# Patient Record
Sex: FEMALE | Race: WHITE | NOT HISPANIC OR LATINO | URBAN - METROPOLITAN AREA
[De-identification: names, ages, dates, MRNs, and addresses within clinical notes are randomized per-mention and may not be internally consistent; named-entity substitution may affect disease eponyms.]

---

## 2022-07-28 PROBLEM — M54.81 OCCIPITAL NEURALGIA OF LEFT SIDE: Status: ACTIVE | Noted: 2022-07-28

## 2022-07-28 PROBLEM — Z87.898 HISTORY OF DIZZINESS: Status: ACTIVE | Noted: 2022-07-28

## 2022-07-28 PROBLEM — M35.9 UNDIFFERENTIATED CONNECTIVE TISSUE DISEASE (HCC): Status: ACTIVE | Noted: 2022-07-28

## 2022-07-28 PROBLEM — I10 PRIMARY HYPERTENSION: Status: ACTIVE | Noted: 2022-07-28

## 2022-07-28 PROBLEM — R90.82 WHITE MATTER ABNORMALITY ON MRI OF BRAIN: Status: ACTIVE | Noted: 2022-07-28

## 2022-07-28 PROBLEM — I73.00 RAYNAUD'S PHENOMENON WITHOUT GANGRENE: Status: ACTIVE | Noted: 2022-07-28

## 2022-07-28 PROBLEM — G43.109 MIGRAINE WITH AURA AND WITHOUT STATUS MIGRAINOSUS, NOT INTRACTABLE: Status: ACTIVE | Noted: 2022-07-28

## 2022-07-28 PROBLEM — H35.033 HYPERTENSIVE RETINOPATHY OF BOTH EYES: Status: ACTIVE | Noted: 2022-07-28

## 2022-07-28 PROBLEM — I42.9 CARDIOMYOPATHY (HCC): Status: ACTIVE | Noted: 2022-07-28

## 2022-07-28 PROBLEM — M79.7 FIBROMYALGIA: Status: ACTIVE | Noted: 2022-07-28

## 2024-07-01 ENCOUNTER — DOCUMENTATION (OUTPATIENT)
Dept: CARDIOLOGY CLINIC | Facility: CLINIC | Age: 44
End: 2024-07-01

## 2024-07-02 ENCOUNTER — APPOINTMENT (OUTPATIENT)
Dept: LAB | Facility: CLINIC | Age: 44
End: 2024-07-02

## 2024-07-02 ENCOUNTER — OCCMED (OUTPATIENT)
Dept: URGENT CARE | Facility: CLINIC | Age: 44
End: 2024-07-02

## 2024-07-02 DIAGNOSIS — Z02.1 ENCOUNTER FOR PRE-EMPLOYMENT HEALTH SCREENING EXAMINATION: ICD-10-CM

## 2024-07-02 DIAGNOSIS — Z02.1 PRE-EMPLOYMENT EXAMINATION: ICD-10-CM

## 2024-07-02 DIAGNOSIS — Z02.1 ENCOUNTER FOR PRE-EMPLOYMENT HEALTH SCREENING EXAMINATION: Primary | ICD-10-CM

## 2024-07-02 LAB — RUBV IGG SERPL IA-ACNC: 66 IU/ML

## 2024-07-02 PROCEDURE — 86735 MUMPS ANTIBODY: CPT

## 2024-07-02 PROCEDURE — 86762 RUBELLA ANTIBODY: CPT

## 2024-07-02 PROCEDURE — 86480 TB TEST CELL IMMUN MEASURE: CPT

## 2024-07-02 PROCEDURE — 36415 COLL VENOUS BLD VENIPUNCTURE: CPT

## 2024-07-02 PROCEDURE — 86765 RUBEOLA ANTIBODY: CPT

## 2024-07-02 PROCEDURE — 86787 VARICELLA-ZOSTER ANTIBODY: CPT

## 2024-07-03 LAB
GAMMA INTERFERON BACKGROUND BLD IA-ACNC: 0.05 IU/ML
M TB IFN-G BLD-IMP: NEGATIVE
M TB IFN-G CD4+ BCKGRND COR BLD-ACNC: 0.16 IU/ML
M TB IFN-G CD4+ BCKGRND COR BLD-ACNC: 0.17 IU/ML
MEV IGG SER QL IA: NORMAL
MITOGEN IGNF BCKGRD COR BLD-ACNC: 9.95 IU/ML
MUV IGG SER QL IA: NORMAL
VZV IGG SER QL IA: NORMAL

## 2024-07-08 NOTE — PROGRESS NOTES
HCM Consultation Visit - Cardiology   Zohreh Woodard 44 y.o. female MRN: [id number]  Unit/Bed#:  Encounter: [id number]    Patient Active Problem List    Diagnosis Date Noted    Primary hypertension 07/28/2022    Cardiomyopathy (HCC) 07/28/2022    Hypertensive retinopathy of both eyes 07/28/2022    Migraine with aura and without status migrainosus, not intractable 07/28/2022    Occipital neuralgia of left side 07/28/2022    Raynaud's phenomenon without gangrene 07/28/2022    History of dizziness 07/28/2022    White matter abnormality on MRI of brain 07/28/2022    Undifferentiated connective tissue disease (HCC) 07/28/2022    Fibromyalgia 07/28/2022     Plan: Ms Abarca hypertension was first clinically discovered at age 25 during her second pregnancy that was also associated with preeclampsia. In November 2021, she was seen by an ophthalmologist and found to have hypertensive retinopathy and a blood pressure of 250 systolic. A year later, her ECG found to be abnormal and that lead to an echocardiogram that disclosed phenotypic hypertrophic obstructive cardiomyopathy. Her hypertension has remained inadequately controlled over the years with a vasodilator (losartan) and a diuretic (HCTZ). On 6- she was admitted with severe chest pain and shortness of breath and found to have hypertension, troponin release and LVOT obstruction. Her antihypertensive medications were switched to verapamil (120 mg daily) and metoprolol succinate (25 mg twice daily) upon discharge. She has noted that her symptoms improved on metoprolol and that she would become more symptomatic towards the end of the 12 hours of metoprolol dose. On cardiology outpatient visit losartan and HCTZ were reinstated due to inadequately controlled blood pressure. Today her blood pressure is 164/90 mmHg in the office. She does not check her blood pressure at home frequently. Today, we spoke about hypertension being the most urgent problem to attend to. She  already has evidence of end-organ damage with hypertensive retinopathy and cerebral microangiopathy. Increased cardiovascular risk is also imposed by the history of preeclampsia. She uses salt liberally as part of her everyday diet. I have asked her to:  Stop losartan/HCTZ  Increase verapamil to 240 mg at night (from the current 120 mg)  Increase metoprolol to 25 mg 3 times daily (at 7 am, 3 pm and 10 pm) from the current 2 times a day.   Restrict salt intake (carefully review labels and do not add salt to food at the table to achieve a 2 gm sodium per day limit, may use salt substitute for flavoring)  Take blood pressure and heart rate readings twice a day in the morning and evening after resting for 5 minutes quietly and record to share.    She is symptomatic with chest discomfort (now mostly before the next dose of metoprolol), dyspnea on exertion (with mild to moderate activity), frequent episodes of orthostatic dizziness and palpitation (improved on metoprolol). He has had one episode of syncope 2 years ago that appears to have been related at least partially to dehydration. She has not noted any lower extremity edema.    I have scheduled her for a bicycle exercise stress echocardiogram in follow up of her episode with troponin leak. A 2-week extended ambulatory heart monitor has also been ordered. She is hesitant to have a cardiac MRI done due to claustrophobia despite premedication. At completion of blood pressure control and workup, we will evaluate her for treatment of her obstructive cardiomyopathy (mavacamten).    Physician Requesting Consult: Douglas Schoenberger, MD  Reason for Consult / Principal Problem: Hypertrophic Cardiomyopathy    HPI: Ms Zohreh Woodard is a 44 year old woman who was once scheduled to be seen in [facility] for suspected HCM but missed the appointment and was subsequently admitted to [facility] with chest pain, troponin leak and evidence of LVOTO on 6-. She is a smoker and has had  chest pain, RASMUSSEN, palpitations, and presyncope for years with recent worsening. She has history of malignant hypertensive retinopathy and papilledema and recent blood pressure of > [id number] during hospital admission. MRI of brain in 2022 had shown multiple small chronic infarcts compatible with small vessel ischemia. An echo in 4/2022 revealed LVEF 70% with severe concentric left ventricular hypertrophy, LIV, mildly reduced RV function with TAPSE 1.5, elevated left atrial pressures and peak LVOT gradient of 100 mmHg with Valsalva. Nuclear myocardial perfusion study in 5/2022 showed no inducible ischemia. She saw Dr. Stanton in 8/2022 who had concerns about HOCM and ordered cMRI which has not been done (claustrophobic, did not tolerate cardiac MRI machine even with benzodiazepine). During recent admission, with LVOTO and type II myocardial injury, she received IV fluids and had negative D-dimer and lung perfusion scan. Her losartan and hydrochlorothiazide was stopped and she was started on losartan 50 mg/HCTZ 25, verapamil 120 mg daily and metoprolol succinate 25 mg twice a day. Echocardiogram later showed no LVOTO. She was asked to undergo stress echo with focus on LVOT gradients.          Risk stratification:    Nonsustained ventricular tachycardia - Ambulatory monitor ordered  Severe left ventricular hypertrophy - No  Family history of sudden death - No  Unexplained syncope - One episode of syncope 2 years ago  LVOT obstruction - Yes  Atrial fibrillation and left atrial dilation - No  Age - 44  NYHA - II-III  Myocardial fibrosis - NA  LV systolic dysfunction - No  Apical aneurysm - No    Review of systems: She is symptomatic with chest discomfort (now mostly before the next dose of metoprolol), dyspnea on exertion (with mild to moderate activity), frequent episodes of orthostatic dizziness and palpitation (improved on metoprolol). He has had one episode of syncope 2 years ago that appears to have been related at  "least partially to dehydration. She has not noted any lower extremity edema.    Family history: Mother is 74 and has hypertension. Father is 75 and has no heart disease. Paternal grandmother  in her sleep from \"heart attack\". Maternal grandfather had a pacemaker and had heart surgery. She has a 43 year old brother with a heart murmur. She has 3 daughters (17, 19 and 26) who are healthy. There is no history of sudden unexplained death in the extended family other than the paternal grandmother.    Genetic testing: Initiated    Devices: None    Historical Information   Past Medical History:   Diagnosis Date    Current moderate episode of major depressive disorder (HCC)     H/O: 1 miscarriage     1st trimester    Hepatitis C antibody positive in blood     Hypertension     Hypertensive retinopathy of both eyes     Migraine with aura and without status migrainosus, not intractable     Moderate episode of recurrent major depressive disorder (HCC)     Occipital neuralgia of left side     White matter abnormality on MRI of brain      Past Surgical History:   Procedure Laterality Date    DILATION AND EVACUATION N/A     1st trimester     Family History   Problem Relation Age of Onset    Hypertension Mother     Hypertension Father     Arthritis Father     Osteoporosis Father     Breast cancer Maternal Grandmother     Diabetes Maternal Grandmother     Heart disease Maternal Grandfather     Stroke Maternal Grandfather     Heart disease Paternal Grandmother     Lung cancer Paternal Aunt      Current Outpatient Medications on File Prior to Visit   Medication Sig Dispense Refill    ALPRAZolam (XANAX) 0.5 mg tablet Take 0.5 mg by mouth if needed      buPROPion (WELLBUTRIN SR) 150 mg 12 hr tablet Take 150 mg by mouth 2 (two) times a day      Cholecalciferol 125 MCG (5000 UT) capsule Take 5,000 Units by mouth daily      Hyzaar 100-25 MG per tablet Take 1 tablet by mouth daily      liraglutide (SAXENDA) injection Inject 3 mg under " "the skin daily       No current facility-administered medications on file prior to visit.     Allergies   Allergen Reactions    Penicillins Other (See Comments)     Not reported by patient     Social History     Substance and Sexual Activity   Alcohol Use Yes    Comment: social     Social History     Substance and Sexual Activity   Drug Use Not on file     Social History     Tobacco Use   Smoking Status Every Day    Current packs/day: 0.50    Average packs/day: 0.5 packs/day for 25.0 years (12.5 ttl pk-yrs)    Types: Cigarettes   Smokeless Tobacco Never     Objective   Vitals:   Vitals:    07/09/24 1117   BP: 164/90   BP Location: Left arm   Patient Position: Sitting   Cuff Size: Standard   Pulse: 64   SpO2: 99%   Weight: 58.5 kg (129 lb)   Body surface area is 1.63 meters squared.  Body mass index is 21.8 kg/m².    Invasive Devices       None                 Physical Exam:  GEN: Zohreh Woodard appears well, alert and oriented x 3, pleasant and cooperative   HEENT: pupils equal, round, and reactive to light; extraocular muscles intact  NECK: supple, no carotid bruits   HEART: regular rhythm, normal S1 and S2, harsh 3/6 murmur heard throughout the precordium with accentuation during standing and obliteration with squatting, no clicks, gallops or rubs   LUNGS: clear to auscultation bilaterally; no wheezes, rales, or rhonchi   ABDOMEN: normal bowel sounds, soft, no tenderness, no distention  EXTREMITIES: peripheral pulses normal; no clubbing, cyanosis, or edema  NEURO: no focal findings   SKIN: normal without suspicious lesions on exposed skin    Lab Results:   No results found for: \"WBC\", \"RBC\", \"HGB\", \"HCT\", \"MCV\", \"PLT\", \"RDW\"  Lab Results   Component Value Date    K 4.4 06/29/2024     06/29/2024    CO2 24 06/29/2024    BUN 15 06/29/2024    CREATININE 1.26 (H) 06/29/2024    EGFR 54 (L) 06/29/2024    CALCIUM 8.6 06/29/2024    AST 22 06/28/2024    ALT 10 06/28/2024    ALKPHOS 43 06/28/2024     Lab Results " "  Component Value Date    MG 1.8 06/29/2024     No results found for: \"CHOL\", \"HDL\", \"TRIG\", \"LDLCALC\"  No results found for: \"CEG1JZBYQLBA\", \"T3FREE\", \"FREET4\", \"L8ZFDBW\", \"N4RFSLL\"    Imaging:   I have personally reviewed pertinent films in PACS  NM lung perfusion imaging    Result Date: 6/28/2024  Narrative: This result has an attachment that is not available. Lung perfusion scan History: Elevated d-dimer. Chest pain. Radiopharmaceutical and technique: 2.2 mCi of Tc-99m MAA was administered intravenously for lung perfusion imaging. Anterior, posterior, right and left anterior and posterior oblique as well as right and left lateral projection of both lungs were obtained for perfusion imaging. Comparison: No prior VQ scan. Recent frontal chest radiograph from 6/27/2024 was reviewed Findings: The perfusion images demonstrate slightly heterogeneous distribution of radiotracer without any segmental or subsegmental perfusion defect.    Impression: Impression:  Low probability for pulmonary embolism. Workstation:[id number]    ECHO 2D COMPLETE DOPPLER AND COLOR FLOW WITH CONTRAST    Result Date: 6/28/2024  Narrative: This result has an attachment that is not available. Sinus rhythm.  Heart rate 70 bpm. Hypertrophic cardiomyopathy with disproportionate hypertrophy of the interventricular septum and increased LV mass index (139 g/m²). No intraventricular or LVOT obstruction at rest or during Valsalva's maneuver. No LV apical aneurysm. No anomalous papillary muscle structure. Normal LV systolic function.  LVEF 73%. Normal LV diastolic and LA pressure (IVRT 187 ms). No LA enlargement. No pulmonary hypertension. Normal RV size and systolic function with no RVOT obstruction. No pericardial disease. No valve disease. No intracardiac tumor, thrombus or valve vegetation. Comments: There was no significant increase in heart rate during the Valsalva maneuver suggesting the maneuver may not have provoked a reduction in left ventricular " filling volume. Left Ventricle Left ventricle is normal in size. There is severe hypertrophy with disproportionate hypertrophy of the IVS (1.7 cm), relative to the inferior wall (1.4 cm). No anomalous papillary muscles. No apical aneurysm. No intraventricular thrombus. Systolic function is normal with an ejection fraction of 61-65%. No intraventricular cavity or LVOT obstruction at rest. No provokable LVOT obstruction during a Valsalva's maneuver. Wall motion is within normal limits. Left atrial pressure is normal, as assessed by prolonged YEED163 ms. Right Ventricle Right ventricle cavity is normal. Systolic function is normal. Left Atrium Left atrium cavity size is normal. Right Atrium Right atrium cavity is normal. IVC/SVC The inferior vena cava demonstrates a diameter of <=21 mm and collapses >50%; therefore, the right atrial pressure is estimated at 0-5 mmHg. Mitral Valve Mitral valve structure is normal. There is trace regurgitation. There is no evidence of mitral valve stenosis. There is no systolic anterior motion and no septal contact between the anterior mitral valve leaflet and the IVS. Tricuspid Valve Tricuspid valve structure is normal. There is trace regurgitation. There is no evidence of tricuspid valve stenosis. Aortic Valve The aortic valve is trileaflet. There is no regurgitation or stenosis. Pulmonic Valve Pulmonic valve structure is normal. There is no regurgitation or stenosis. Ascending Aorta The aorta appears normal in size. Pericardium There is no pericardial effusion. Noncardiac Significant Findings: [place] Act 112. Study Details A complete 2D echocardiogram was performed. Color flow, Pulse Wave and Continuous Wave Doppler was performed and analyzed.Definity contrast was used during the study. The study was difficult due to patient's clinical status and heart rhythm.    US VENOUS DUPLEX LOWER EXTREMITY BILATERAL    Result Date: 6/28/2024  Narrative: Indication: Elevated d-dimer, assess for DVT.  Comparison: None. Duplex color and compression ultrasound of the deep veins of both legs was performed. B-mode two-dimensional vascular structure, Doppler spectral analysis, and color flow Doppler imaging was performed. Findings: The common femoral, femoral, popliteal, greater saphenous, and calf veins are patent and compressible within both legs, without evidence of deep venous thrombosis or venous obstruction.    Impression: Impression: No evidence of DVT or venous abnormality within either leg. Workstation:[id number]    XR chest pa & lateral    Result Date: 6/27/2024  Narrative: PROCEDURE INFORMATION: Exam: XR Chest Exam date and time: 6/27/2024 10:24 PM Age: 44 years old Clinical indication: Pain; Angina pectoris; Additional info: Chest pain for one month TECHNIQUE: Imaging protocol: Radiologic exam of the chest. Views: 2 views. COMPARISON: No relevant prior studies available. FINDINGS: Lungs: No consolidation or pulmonary edema. Pleural spaces: No pleural effusion. No pneumothorax. Heart/Mediastinum: Cardiomediastinal silhouette is normal in size. Bones/joints: No acute fractures.     Impression: IMPRESSION: No acute findings.     EKG: Outside ECG dated 7-1-2024. Sinus rhythm at 93 bpm, LVH with repolarization abnormalities, LAE    Counseling / Coordination of Care  Total time spent today 65 minutes.  Greater than 50% of total time was spent with the patient and / or family counseling and / or coordination of care.

## 2024-07-09 ENCOUNTER — OFFICE VISIT (OUTPATIENT)
Dept: CARDIOLOGY CLINIC | Facility: CLINIC | Age: 44
End: 2024-07-09
Payer: COMMERCIAL

## 2024-07-09 VITALS
SYSTOLIC BLOOD PRESSURE: 164 MMHG | OXYGEN SATURATION: 99 % | WEIGHT: 129 LBS | DIASTOLIC BLOOD PRESSURE: 90 MMHG | HEART RATE: 64 BPM | BODY MASS INDEX: 21.8 KG/M2

## 2024-07-09 DIAGNOSIS — I42.9 CARDIOMYOPATHY, UNSPECIFIED TYPE (HCC): ICD-10-CM

## 2024-07-09 DIAGNOSIS — I10 PRIMARY HYPERTENSION: Primary | ICD-10-CM

## 2024-07-09 PROCEDURE — 99205 OFFICE O/P NEW HI 60 MIN: CPT | Performed by: INTERNAL MEDICINE

## 2024-07-09 RX ORDER — METOPROLOL SUCCINATE 25 MG/1
25 TABLET, EXTENDED RELEASE ORAL 2 TIMES DAILY
COMMUNITY

## 2024-07-10 ENCOUNTER — DOCUMENTATION (OUTPATIENT)
Dept: VASCULAR SURGERY | Facility: CLINIC | Age: 44
End: 2024-07-10

## 2024-07-15 ENCOUNTER — TELEPHONE (OUTPATIENT)
Dept: CARDIOLOGY CLINIC | Facility: CLINIC | Age: 44
End: 2024-07-15

## 2024-07-17 ENCOUNTER — TELEPHONE (OUTPATIENT)
Dept: CARDIOLOGY CLINIC | Facility: CLINIC | Age: 44
End: 2024-07-17

## 2024-07-17 NOTE — TELEPHONE ENCOUNTER
L/M/M for patient- as per Dr Jones's request, he would like patient to send 1 week of blood pressure/heart rate readings

## 2024-08-12 ENCOUNTER — HOSPITAL ENCOUNTER (OUTPATIENT)
Dept: NON INVASIVE DIAGNOSTICS | Facility: HOSPITAL | Age: 44
Discharge: HOME | End: 2024-08-12
Payer: COMMERCIAL

## 2024-08-12 VITALS
HEART RATE: 75 BPM | HEIGHT: 65 IN | SYSTOLIC BLOOD PRESSURE: 140 MMHG | BODY MASS INDEX: 21.49 KG/M2 | DIASTOLIC BLOOD PRESSURE: 100 MMHG | WEIGHT: 129 LBS

## 2024-08-12 DIAGNOSIS — I42.9 CARDIOMYOPATHY, UNSPECIFIED TYPE (HCC): ICD-10-CM

## 2024-08-12 LAB
AORTIC ROOT: 3.2 CM
FRACTIONAL SHORTENING: 30 (ref 28–44)
GLOBAL LONGITUIDAL STRAIN: -10 %
INTERVENTRICULAR SEPTUM IN DIASTOLE (PARASTERNAL SHORT AXIS VIEW): 1.9 CM
INTERVENTRICULAR SEPTUM: 1.9 CM (ref 0.6–1.1)
LEFT INTERNAL DIMENSION IN SYSTOLE: 2.3 CM (ref 2.1–4)
LEFT VENTRICULAR INTERNAL DIMENSION IN DIASTOLE: 3.3 CM (ref 3.5–6)
LEFT VENTRICULAR POSTERIOR WALL IN END DIASTOLE: 1.3 CM
LEFT VENTRICULAR STROKE VOLUME: 25 ML
LVSV (TEICH): 25 ML
MAX HR PERCENT: 65 %
MAX HR: 114 BPM
RATE PRESSURE PRODUCT: NORMAL
SL CV LV EF: 74
SL CV PED ECHO LEFT VENTRICLE DIASTOLIC VOLUME (MOD BIPLANE) 2D: 43 ML
SL CV PED ECHO LEFT VENTRICLE SYSTOLIC VOLUME (MOD BIPLANE) 2D: 18 ML
SL CV STRESS RECOVERY BP: NORMAL MMHG
SL CV STRESS RECOVERY HR: 82 BPM
SL CV STRESS RECOVERY O2 SAT: 98 %
STRESS ANGINA INDEX: 0
STRESS BASELINE BP: NORMAL MMHG
STRESS BASELINE HR: 75 BPM
STRESS O2 SAT REST: 97 %
STRESS PEAK HR: 114 BPM
STRESS POST ESTIMATED WORKLOAD: 6.9 METS
STRESS POST EXERCISE DUR MIN: 6 MIN
STRESS POST EXERCISE DUR SEC: 56 SEC
STRESS POST O2 SAT PEAK: 99 %
STRESS POST PEAK BP: 212 MMHG

## 2024-08-12 PROCEDURE — 93356 MYOCRD STRAIN IMG SPCKL TRCK: CPT

## 2024-08-12 PROCEDURE — 93356 MYOCRD STRAIN IMG SPCKL TRCK: CPT | Performed by: INTERNAL MEDICINE

## 2024-08-12 PROCEDURE — 93350 STRESS TTE ONLY: CPT

## 2024-08-12 PROCEDURE — 93350 STRESS TTE ONLY: CPT | Performed by: INTERNAL MEDICINE

## 2024-08-13 ENCOUNTER — TELEPHONE (OUTPATIENT)
Dept: CARDIOLOGY CLINIC | Facility: CLINIC | Age: 44
End: 2024-08-13

## 2024-08-13 DIAGNOSIS — I42.2 HYPERTROPHIC CARDIOMYOPATHY (HCC): Primary | ICD-10-CM

## 2024-08-13 RX ORDER — BISOPROLOL FUMARATE 10 MG/1
10 TABLET, FILM COATED ORAL DAILY
Qty: 30 TABLET | Refills: 1 | Status: SHIPPED | OUTPATIENT
Start: 2024-08-13 | End: 2024-10-12

## 2024-08-13 NOTE — PROGRESS NOTES
Patient underwent exercise (bicycle) stress echocardiography yesterday with the following results:    A bicycle protocol stress test was performed. Overall, the patient's exercise capacity was moderately impaired for their age. The patient after exercising for 6 min and 56 sec and had a maximal HR of 114 bpm (65% of MPHR) and 6.9 METS. The patient experienced no angina during the test. The test was stopped because the patient experienced fatigue and dyspnea. The test was stopped because of hypertension. The patient reported dyspnea and fatigue during the stress test. Symptoms began during stress and ended during recovery. Blood pressure demonstrated a hypertensive response and heart rate demonstrated a blunted response to stress.       Left Ventricle: Wall thickness is severely increased. Maximal thickness 26 mm. There is severe asymmetric hypertrophy of the basal and mid septal walls. The left ventricular ejection fraction is 74%. Systolic function is hyperdynamic. Global longitudinal strain is reduced at -10%. Wall motion is normal. Diastolic function is mildly abnormal, consistent with grade I (abnormal) relaxation. There are sequential mid-cavitary and outflow tract dynamic obstruction at rest with a peak gradient of 50 mmHg. There are sequential mid-cavitary and outflow tract dynamic obstruction with Valsalva with a peak gradient of 115 mmHg.    Aortic Valve: There is mild regurgitation.    Mitral Valve: There is systolic anterior motion with late peaking gradient.    Stress ECG: Down sloping ST depression in the inferolateral leads (II, III, aVF, V5 and V6) is noted. There were no arrhythmias during recovery. . The stress ECG is equivocal for ischemia after submaximal exercise, without reproduction of symptoms.    Post Stress Echo: Left ventricle cavity is small post-stress. The left ventricle systolic function is hyperdynamic post-stress.    Echo Post Impression: The study is negative for inducible myocardial  ischemia at submaximal exercise. Findings are consistent with obstructive hypertrophic cardiomyopathy.    The test revealed poor exercise tolerance, uncontrolled hypertension, obstructive hypertrophic cardiomyopathy with a maximum LV wall thickness of 26 mmHg.    She was advised to:  Decrease salt intake to <1500 mg perday.  Measure blood pressure and heart rate twice daily and chart  Continue verapamil 240 mg per day.at night  Discontinue metoprolol  Start bisoprolol 10 mg per day (morning)    Temo Jones MD

## 2024-08-13 NOTE — TELEPHONE ENCOUNTER
"I tried to reach pt via telephone but needed to leave a message and also left a message for pt in [employer]. Medication directions Per Dr Jones:  She was advised to:  Decrease salt intake to <1500 mg perday.  Measure blood pressure and heart rate twice daily and chart   Continue verapamil 240 mg per day.at night  Discontinue metoprolol  Start bisoprolol 10 mg per day (morning)- medication called into pharmacy this morning  Finish today w/ original medication; start new regimen tomorrow 8/14/24   Asked to Please call our office to acknowledge medication changes [phone]   I will call in a week for blood pressure and heart rate readings, or asked to please send a message in \"[employer]\" to Dr Jones with them   "

## 2024-08-15 LAB
CHEST PAIN STATEMENT: NORMAL
MAX DIASTOLIC BP: 104 MMHG
MAX PREDICTED HEART RATE: 176 BPM
PROTOCOL NAME: NORMAL
STRESS POST EXERCISE DUR MIN: 6 MIN
STRESS POST EXERCISE DUR SEC: 56 SEC
STRESS POST PEAK HR: 115 BPM
STRESS POST PEAK SYSTOLIC BP: 212 MMHG
TARGET HR FORMULA: NORMAL
TEST INDICATION: NORMAL

## 2024-09-12 DIAGNOSIS — I42.2 HYPERTROPHIC CARDIOMYOPATHY (HCC): ICD-10-CM

## 2024-09-12 RX ORDER — BISOPROLOL FUMARATE 10 MG/1
10 TABLET, FILM COATED ORAL DAILY
Qty: 90 TABLET | Refills: 1 | Status: SHIPPED | OUTPATIENT
Start: 2024-09-12

## 2024-09-18 ENCOUNTER — TELEPHONE (OUTPATIENT)
Dept: CARDIOLOGY CLINIC | Facility: CLINIC | Age: 44
End: 2024-09-18

## 2024-09-18 DIAGNOSIS — I10 PRIMARY HYPERTENSION: ICD-10-CM

## 2024-09-18 DIAGNOSIS — I42.2 HYPERTROPHIC CARDIOMYOPATHY (HCC): Primary | ICD-10-CM

## 2024-09-18 RX ORDER — VERAPAMIL HYDROCHLORIDE 240 MG/1
240 TABLET, FILM COATED, EXTENDED RELEASE ORAL
Qty: 90 TABLET | Refills: 2 | Status: SHIPPED | OUTPATIENT
Start: 2024-09-18 | End: 2025-06-15

## 2024-09-18 NOTE — TELEPHONE ENCOUNTER
L/M/M for patient -verapamil was refilled. However, it was changed to 240 mg, so she is to take only one at bedtime . Call us w/ any issues

## 2024-11-29 ENCOUNTER — OFFICE VISIT (OUTPATIENT)
Dept: URGENT CARE | Facility: CLINIC | Age: 44
End: 2024-11-29
Payer: COMMERCIAL

## 2024-11-29 DIAGNOSIS — Z23 ENCOUNTER FOR IMMUNIZATION: Primary | ICD-10-CM

## 2024-11-29 PROCEDURE — G0381 LEV 2 HOSP TYPE B ED VISIT: HCPCS

## 2024-11-29 PROCEDURE — S9083 URGENT CARE CENTER GLOBAL: HCPCS

## 2024-11-29 PROCEDURE — 90656 IIV3 VACC NO PRSV 0.5 ML IM: CPT

## 2024-11-29 PROCEDURE — 90471 IMMUNIZATION ADMIN: CPT

## 2024-12-26 ENCOUNTER — TELEPHONE (OUTPATIENT)
Age: 44
End: 2024-12-26

## 2025-01-13 NOTE — PROGRESS NOTES
[facility] Follow-up Visit - Cardiology   Zohreh Woodard 44 y.o. female MRN: [id number]  Unit/Bed#:  Encounter: [id number]    Patient Active Problem List    Diagnosis Date Noted    Primary hypertension 07/28/2022    Cardiomyopathy (HCC) 07/28/2022    Hypertensive retinopathy of both eyes 07/28/2022    Migraine with aura and without status migrainosus, not intractable 07/28/2022    Occipital neuralgia of left side 07/28/2022    Raynaud's phenomenon without gangrene 07/28/2022    History of dizziness 07/28/2022    White matter abnormality on MRI of brain 07/28/2022    Undifferentiated connective tissue disease (HCC) 07/28/2022    Fibromyalgia 07/28/2022     Plan: Ms Zohreh Woodard was seen on consultation in the [facility] on 7-9-2024. He has severe, difficult to control hypertension and obstructive hypertrophic cardiomyopathy. At that time she was taken off a vasodilator and a diuretic and was started on verapamil and metoprolol. Metoprolol was later switched to bisoprolol for better blood pressure control. On 8-, she underwent exercise (bicycle) stress echocardiography:    A bicycle protocol stress test was performed. Overall, the patient's exercise capacity was moderately impaired for their age. The patient after exercising for 6 min and 56 sec and had a maximal HR of 114 bpm (65% of MPHR) and 6.9 METS. The patient experienced no angina during the test. The test was stopped because the patient experienced fatigue and dyspnea. The test was stopped because of hypertension. The patient reported dyspnea and fatigue during the stress test. Symptoms began during stress and ended during recovery. Blood pressure demonstrated a hypertensive response and heart rate demonstrated a blunted response to stress.     Left Ventricle: Wall thickness is severely increased. Maximal thickness 26 mm. There is severe asymmetric hypertrophy of the basal and mid septal walls. The left ventricular ejection fraction is 74%. Systolic  function is hyperdynamic. Global longitudinal strain is reduced at -10%. Wall motion is normal. Diastolic function is mildly abnormal, consistent with grade I (abnormal) relaxation. There are sequential mid-cavitary and outflow tract dynamic obstruction at rest with a peak gradient of 50 mmHg. There are sequential mid-cavitary and outflow tract dynamic obstruction with Valsalva with a peak gradient of 115 mmHg.    Aortic Valve: There is mild regurgitation.    Mitral Valve: There is systolic anterior motion with late peaking gradient.    Stress ECG: Down sloping ST depression in the inferolateral leads (II, III, aVF, V5 and V6) is noted. There were no arrhythmias during recovery. . The stress ECG is equivocal for ischemia after submaximal exercise, without reproduction of symptoms.    Post Stress Echo: Left ventricle cavity is small post-stress. The left ventricle systolic function is hyperdynamic post-stress.    Echo Post Impression: The study is negative for inducible myocardial ischemia at submaximal exercise. Findings are consistent with obstructive hypertrophic cardiomyopathy.    Ms Woodard had made significant progress while on semaglutide and had lost nearly 30 lbs with improved overall health. Unfortunately due to insurance issues she has had to stop the medication and has regained all the weight she has lost and is currently in poor general health and her blood pressure is 154/80 and she is experiencing worsening exertional dyspnea, bendopnea and recurrent palpitations. She has also noted lower extremity edema. She denies chest pain, dizziness, syncope or headaches. She does not drink much water on the daily basis, does not restrict sodium in her diet and is mostly sedentary.  On physical examination she does have the typical murmur of dynamic LVOT obstruction. She has not done her cardiac MRI due to claustrophobia and there has been no recent blood work.     The following recommendations were made:  Adequate  hydration  Start restricting the amount of sodium (<2 gm) in diet  Start measuring BP and HR at home twice a day and record for review  Will adjust BP medications as needed to control blood pressure with a goal of consistent control <130/85 mmHg  Obtain blood work to assess kidney function  If feasible obtain cardiac MRI with IV sedation  Restart semaglutide and resume dieting to lose the excess weight  Will reassess with echocardiography to consider mavacamten  Perform genetic testing to assist with family screening (one brother and 3 young daughters)    Physician Requesting Consult: Douglas Schoenberger, MD  Reason for Consult / Principal Problem: Hypertrophic Cardiomyopathy     HPI: Ms Zohreh Woodard is a 44 year old woman who was once scheduled to be seen in [facility] for suspected HCM but missed the appointment and was subsequently admitted to [facility] with chest pain, troponin leak and evidence of LVOTO on 6-. She is a smoker and has had chest pain, RASMUSSEN, palpitations, and presyncope for years with recent worsening. She has history of malignant hypertensive retinopathy and papilledema and recent blood pressure of > 470121 during hospital admission. MRI of brain in 2022 had shown multiple small chronic infarcts compatible with small vessel ischemia. An echo in 4/2022 revealed LVEF 70% with severe concentric left ventricular hypertrophy, LIV, mildly reduced RV function with TAPSE 1.5, elevated left atrial pressures and peak LVOT gradient of 100 mmHg with Valsalva. Nuclear myocardial perfusion study in 5/2022 showed no inducible ischemia. She saw Dr. Stanton in 8/2022 who had concerns about HOCM and ordered cMRI which has not been done (claustrophobic, did not tolerate cardiac MRI machine even with benzodiazepine). During recent admission, with LVOTO and type II myocardial injury, she received IV fluids and had negative D-dimer and lung perfusion scan. Her losartan and hydrochlorothiazide was stopped and she  was started on verapamil 120 mg daily and metoprolol succinate 25 mg twice a day. Echocardiogram later showed no LVOTO. She was asked to undergo stress echo with focus on LVOT gradients.            7-9-2024: Ms Abarca hypertension was first clinically discovered at age 25 during her second pregnancy that was also associated with preeclampsia. In November 2021, she was seen by an ophthalmologist and found to have hypertensive retinopathy and a blood pressure of 250 systolic. A year later, her ECG found to be abnormal and that lead to an echocardiogram that disclosed phenotypic hypertrophic obstructive cardiomyopathy. Her hypertension has remained inadequately controlled over the years with a vasodilator (losartan) and a diuretic (HCTZ). On 6- she was admitted with severe chest pain and shortness of breath and found to have hypertension, troponin release and LVOT obstruction. Her antihypertensive medications were switched to verapamil (120 mg daily) and metoprolol succinate (25 mg twice daily) upon discharge. She has noted that her symptoms improved on metoprolol and that she would become more symptomatic towards the end of the 12 hours of metoprolol dose. On cardiology outpatient visit losartan and HCTZ were reinstated due to inadequately controlled blood pressure. Today her blood pressure is 164/90 mmHg in the office. She does not check her blood pressure at home frequently. Today, we spoke about hypertension being the most urgent problem to attend to. She already has evidence of end-organ damage with hypertensive retinopathy and cerebral microangiopathy. Increased cardiovascular risk is also imposed by the history of preeclampsia. She uses salt liberally as part of her everyday diet. I have asked her to:  Stop losartan/HCTZ  Increase verapamil to 240 mg at night (from the current 120 mg)  Increase metoprolol to 25 mg 3 times daily (at 7 am, 3 pm and 10 pm) from the current 2 times a day.   Restrict salt  "intake (carefully review labels and do not add salt to food at the table to achieve a 2 gm sodium per day limit, may use salt substitute for flavoring)  Take blood pressure and heart rate readings twice a day in the morning and evening after resting for 5 minutes quietly and record to share.     She is symptomatic with chest discomfort (now mostly before the next dose of metoprolol), dyspnea on exertion (with mild to moderate activity), frequent episodes of orthostatic dizziness and palpitation (improved on metoprolol). He has had one episode of syncope 2 years ago that appears to have been related at least partially to dehydration. She has not noted any lower extremity edema.     I have scheduled her for a bicycle exercise stress echocardiogram in follow up of her episode with troponin leak. A 2-week extended ambulatory heart monitor has also been ordered. She is hesitant to have a cardiac MRI done due to claustrophobia despite premedication. At completion of blood pressure control and workup, we will evaluate her for treatment of her obstructive cardiomyopathy (mavacamten).    Risk stratification:     Nonsustained ventricular tachycardia - Ambulatory monitor ordered  Severe left ventricular hypertrophy - No  Family history of sudden death - No  Unexplained syncope - One episode of syncope 2 years ago  LVOT obstruction - Yes  Atrial fibrillation and left atrial dilation - No  Age - 44  NYHA - II-III  Myocardial fibrosis - NA  LV systolic dysfunction - No  Apical aneurysm - No     Review of systems: She is experiencing worsening exertional dyspnea, bendopnea and recurrent palpitations. She has also noted lower extremity edema. She denies chest pain, dizziness, syncope or headaches.     Family history: Mother is 74 and has hypertension and dementia. Father is 76 and has no heart disease. Paternal grandmother  in her sleep from \"heart attack\". Maternal grandfather had a pacemaker and had heart surgery. She has a " 43 year old brother with a heart murmur. She has 3 daughters (17, 19 and 27) who are healthy. There is no history of sudden unexplained death in the extended family other than the paternal grandmother.     Genetic testing: Initiated     Devices: None    Historical Information   Past Medical History:   Diagnosis Date    Current moderate episode of major depressive disorder (HCC)     H/O: 1 miscarriage     1st trimester    Hepatitis C antibody positive in blood     Hypertension     Hypertensive retinopathy of both eyes     Migraine with aura and without status migrainosus, not intractable     Moderate episode of recurrent major depressive disorder (HCC)     Occipital neuralgia of left side     White matter abnormality on MRI of brain      Past Surgical History:   Procedure Laterality Date    DILATION AND EVACUATION N/A     1st trimester     Family History   Problem Relation Age of Onset    Hypertension Mother     Hypertension Father     Arthritis Father     Osteoporosis Father     Breast cancer Maternal Grandmother     Diabetes Maternal Grandmother     Heart disease Maternal Grandfather     Stroke Maternal Grandfather     Heart disease Paternal Grandmother     Lung cancer Paternal Aunt      Current Outpatient Medications on File Prior to Visit   Medication Sig Dispense Refill    ALPRAZolam (XANAX) 0.5 mg tablet Take 0.5 mg by mouth if needed      bisoprolol (ZEBETA) 10 MG tablet TAKE 1 TABLET BY MOUTH EVERY DAY 90 tablet 1    buPROPion (WELLBUTRIN SR) 150 mg 12 hr tablet Take 300 mg by mouth 2 (two) times a day      Cholecalciferol 125 MCG (5000 UT) capsule Take 5,000 Units by mouth daily      Hyzaar 100-25 MG per tablet Take 1 tablet by mouth daily (Patient not taking: Reported on 8/12/2024)      liraglutide (SAXENDA) injection Inject 3 mg under the skin daily (Patient not taking: Reported on 7/9/2024)      verapamil (CALAN-SR) 240 mg CR tablet Take 1 tablet (240 mg total) by mouth daily at bedtime 90 tablet 2     No  "current facility-administered medications on file prior to visit.     Allergies   Allergen Reactions    Penicillins Other (See Comments)     Not reported by patient     Social History     Substance and Sexual Activity   Alcohol Use Yes    Comment: social     Social History     Substance and Sexual Activity   Drug Use Not on file     Social History     Tobacco Use   Smoking Status Every Day    Current packs/day: 0.50    Average packs/day: 0.5 packs/day for 25.0 years (12.5 ttl pk-yrs)    Types: Cigarettes   Smokeless Tobacco Never     Objective   Vitals:   Vitals:    01/14/25 1444   BP: 154/80   BP Location: Right arm   Patient Position: Sitting   Cuff Size: Standard   Pulse: 58   SpO2: 93%   Weight: 77.3 kg (170 lb 6.4 oz)   Body surface area is 1.84 meters squared.  Body mass index is 28.8 kg/m².    Invasive Devices       None                 Physical Exam:  GEN: Zohreh Woodard appears well, alert and oriented x 3, pleasant and cooperative   HEENT: pupils equal, round, and reactive to light; extraocular muscles intact  NECK: supple, no carotid bruits   HEART: regular rhythm, normal S1 and S2, 3/6 systolic murmur heard throughout the precordium with increased intensity upon standing and with Valsalva, + S4 gallop, no clicks, or rubs   LUNGS: clear to auscultation bilaterally; no wheezes, rales, or rhonchi   ABDOMEN: normal bowel sounds, soft, no tenderness, no distention  EXTREMITIES: peripheral pulses normal; no clubbing, or cyanosis, + non-pitting edema  NEURO: no focal findings   SKIN: normal without suspicious lesions on exposed skin    Lab Results:   No results found for: \"WBC\", \"RBC\", \"HGB\", \"HCT\", \"MCV\", \"PLT\", \"RDW\"  Lab Results   Component Value Date    K 4.4 06/29/2024     06/29/2024    CO2 24 06/29/2024    BUN 15 06/29/2024    CREATININE 1.26 (H) 06/29/2024    EGFR 54 (L) 06/29/2024    CALCIUM 8.6 06/29/2024    AST 22 06/28/2024    ALT 10 06/28/2024    ALKPHOS 43 06/28/2024     Lab Results " "  Component Value Date    MG 1.8 2024     No results found for: \"CHOL\", \"HDL\", \"TRIG\", \"LDLCALC\"  No results found for: \"ECJ4AHLVJVPQ\", \"T3FREE\", \"FREET4\", \"J2CCRQP\", \"L3FXACT\"    Imaging:   I have personally reviewed pertinent films in PACS    Cardiac testing:     Name: Zohreh Woodard                       : 1980  MRN: [id number]                       Age: 44 y.o.  Patient Status: Outpatient          Gender: female  Echo stress test, exercise w/ strain    Height: 5' 4.5\" (1.638 m)   Weight: 58.5 kg (129 lb)   BSA: 1.63 m²   Blood Pressure: Not recorded    Date of Study: 24   Ordering Provider: Temo Jones MD   Clinical Indications: Cardiomyopathy       Reading Physicians  Performing Staff   Cardiology: Temo Jones MD    Tech: LANETTE Agudelo    Support Staff: JUAN Couch RN         Height & Weight    Height Weight BSA (Calculated - m2)          PACS Images     Show images for Echo stress test, exercise  Study Details    Study quality was adequate. The apical and parasternal views were obtained.     History    Htn, family hs, smoking, hep c, depression , hypertensive retinopathy     Interpretation Summary  Show Result Comparison     Left Ventricle: Wall thickness is severely increased. Maximal thickness 26 mm. There is severe asymmetric hypertrophy of the basal and mid septal walls. The left ventricular ejection fraction is 74%. Systolic function is hyperdynamic. Global longitudinal strain is reduced at -10%. Wall motion is normal. Diastolic function is mildly abnormal, consistent with grade I (abnormal) relaxation. There are sequential mid-cavitary and outflow tract dynamic obstruction at rest with a peak gradient of 50 mmHg. There are sequential mid-cavitary and outflow tract dynamic obstruction with Valsalva with a peak gradient of 115 mmHg.    Aortic Valve: There is mild regurgitation.    Mitral Valve: There is systolic anterior motion with late peaking " gradient.    Stress ECG: Down sloping ST depression in the inferolateral leads (II, III, aVF, V5 and V6) is noted. There were no arrhythmias during recovery. . The stress ECG is equivocal for ischemia after submaximal exercise, without reproduction of symptoms.    Post Stress Echo: Left ventricle cavity is small post-stress. The left ventricle systolic function is hyperdynamic post-stress.    Echo Post Impression: The study is negative for inducible myocardial ischemia at submaximal exercise. Findings are consistent with obstructive hypertrophic cardiomyopathy.     Strain was performed to quantify interventricular dyssynchrony and evaluate components of myocardial function due to HCM. Results from the utilization of Strain Analysis are listed in the report below.     Stress Findings    Stress Findings A bicycle protocol stress test was performed. Overall, the patient's exercise capacity was moderately impaired for their age. The patient after exercising for 6 min and 56 sec and had a maximal HR of 114 bpm (65% of MPHR) and 6.9 METS. The patient experienced no angina during the test. The test was stopped because the patient experienced fatigue and dyspnea. The test was stopped because of hypertension. The patient reported dyspnea and fatigue during the stress test. Symptoms began during stress and ended during recovery. Blood pressure demonstrated a hypertensive response and heart rate demonstrated a blunted response to stress.     Findings    Left Ventricle Left ventricular cavity size is small. Wall thickness is severely increased. Maximal thickness 26 mm. There is severe asymmetric hypertrophy of the basal and mid septal walls. The left ventricular ejection fraction is 74%. Systolic function is hyperdynamic. Global longitudinal strain is reduced at -10%.  Wall motion is normal. Diastolic function is mildly abnormal, consistent with grade I (abnormal) relaxation.  There are sequential mid-cavitary and outflow tract  dynamic obstruction at rest with a peak gradient of 50 mmHg. There are sequential mid-cavitary and outflow tract dynamic obstruction with Valsalva with a peak gradient of 115 mmHg.   Right Ventricle Right ventricular cavity size is normal. Systolic function is normal. Wall thickness is normal.   Left Atrium The atrium is normal in size.   Right Atrium The atrium is normal in size.   Aortic Valve The aortic valve is trileaflet. The leaflets are not thickened. The leaflets are not calcified. The leaflets exhibit normal mobility. There is mild regurgitation. The aortic valve has no significant stenosis.   Mitral Valve Mitral valve structure is normal.  There is no evidence of regurgitation. There is no evidence of stenosis. There is systolic anterior motion with late peaking gradient.   Tricuspid Valve Tricuspid valve structure is normal. There is no evidence of regurgitation. There is no evidence of stenosis.   Pulmonic Valve Pulmonic valve structure is normal. There is no evidence of regurgitation. There is no evidence of stenosis.   Ascending Aorta The aortic root is normal in size.   Pericardium There is no pericardial effusion. The pericardium is normal in appearance.     Stress Echo Findings    Study Impression The study is negative for inducible myocardial ischemia at submaximal exercise. Findings are consistent with obstructive hypertrophic cardiomyopathy.     Stress Measurements    Baseline Vitals   Baseline HR 75 bpm         Baseline /100 mmHg         O2 sat rest 97 %         Peak Stress Vitals   Stress peak  bpm         Post peak  mmHg         O2 sat peak 99 %         Max HR Percent 65 %         Max  bpm         Recovery Vitals   Recovery HR 82 bpm         Recovery /88 mmHg         O2 sat recovery 98 %          Exercise Data   Max  bpm         Exercise duration (min) 6 min         Exercise duration (sec) 56 sec         Estimated workload 6.9 METS         Angina Index 0          Rate Pressure Product 24,168              Stage Data 8/12/24  1:31 PM--8/12/24  2:30 PM    Date/Time Stage BP HR O2 RPP Bicycle - [place] Symptoms   08/12/24 1331 BASELINE 140/100 75 bpm 97 % 47166 -- lungs cta, no loud murmur   08/12/24 1414 STAGE 1 172/102 84 bpm 97 % 35398 25 none   08/12/24 1416 STAGE 2 200/110 95 bpm 98 % 10019 50 mild sob   08/12/24 1418 STAGE 3 202/110 102 bpm 98 % 35981 75 mild sob /fatigue   08/12/24 1419 STAGE 4 -- 114 bpm -- -- 100 mod sob/ fatigue   08/12/24 1420 IMMEDIATE/PEAK 212/110 114 bpm 98 % 15112 -- mod sob /fatigue   08/12/24 1421 RECOVERY 1 200/94 86 bpm 98 % 88493 -- symptoms resolving   08/12/24 1423 RECOVERY 2 170/88 86 bpm 98 % 12924 -- none     Left Ventricle Measurements    Function/Volumes   Left ventricular stroke volume (2D) 25 mL         Dimensions   LVIDd 3.3 cm         LVIDS 2.3 cm         IVSd 1.9 cm         LVPWd 1.3 cm         FS 30         Strain   GLS -10 %               Aorta Measurements    Aortic Dimensions   Ao root 3.2 cm               Exam Details    Performed Procedure Technologist Supporting Staff Performing Physician   Echo stress test, exercise w/ strain Aidan Hinson, LANETTE Good, RN   Gladys Carlos, RN  Temo Jones MD         Appointment Date/Status Modality Department    8/12/2024     Completed BE STRESS 1 BE CAR NON INV           Begin Exam End Exam Begin Exam Questionnaires End Exam Questionnaires   8/12/2024  1:31 PM 8/12/2024  2:30 PM CV STRESS QUESTIONNAIRE PATIENT EDUCATION            All Reviewers List    Temo Jones MD on 8/13/2024  7:03 AM     Signed    Electronically signed by Temo Jones MD on 8/12/24 at 1601 EDT     Counseling / Coordination of Care  Total floor / unit time spent today 55 minutes.  Greater than 50% of total time was spent with the patient and / or family counseling and / or coordination of care.

## 2025-01-14 ENCOUNTER — OFFICE VISIT (OUTPATIENT)
Dept: CARDIOLOGY CLINIC | Facility: CLINIC | Age: 45
End: 2025-01-14
Payer: COMMERCIAL

## 2025-01-14 VITALS
WEIGHT: 170.4 LBS | OXYGEN SATURATION: 93 % | HEART RATE: 58 BPM | SYSTOLIC BLOOD PRESSURE: 154 MMHG | DIASTOLIC BLOOD PRESSURE: 80 MMHG | BODY MASS INDEX: 28.8 KG/M2

## 2025-01-14 DIAGNOSIS — I10 PRIMARY HYPERTENSION: Primary | ICD-10-CM

## 2025-01-14 DIAGNOSIS — H35.033 HYPERTENSIVE RETINOPATHY OF BOTH EYES: ICD-10-CM

## 2025-01-14 DIAGNOSIS — I42.9 CARDIOMYOPATHY, UNSPECIFIED TYPE (HCC): ICD-10-CM

## 2025-01-14 PROCEDURE — 99215 OFFICE O/P EST HI 40 MIN: CPT | Performed by: INTERNAL MEDICINE

## 2025-01-16 ENCOUNTER — TELEPHONE (OUTPATIENT)
Dept: CARDIOLOGY CLINIC | Facility: CLINIC | Age: 45
End: 2025-01-16

## 2025-01-16 NOTE — TELEPHONE ENCOUNTER
Call placed to patient to inform her that cardiac MRI can be completed under IV sedation if needed. Order for cardiac MRI is in place.

## 2025-01-23 ENCOUNTER — TELEPHONE (OUTPATIENT)
Dept: CARDIOLOGY CLINIC | Facility: CLINIC | Age: 45
End: 2025-01-23

## 2025-01-23 NOTE — TELEPHONE ENCOUNTER
Call placed to patient to obtain BP log, voice mailbox is full, unable to leave vm. Message send to patient through Epic.

## 2025-01-30 ENCOUNTER — TELEPHONE (OUTPATIENT)
Dept: CARDIOLOGY CLINIC | Facility: CLINIC | Age: 45
End: 2025-01-30

## 2025-02-17 ENCOUNTER — TELEPHONE (OUTPATIENT)
Dept: CARDIOLOGY CLINIC | Facility: CLINIC | Age: 45
End: 2025-02-17

## 2025-02-17 NOTE — TELEPHONE ENCOUNTER
Call placed to patient to obtain bp log and to remind her of standing order for cardiac MRI. Per patient, she recently lost her job and will be without insurance. She states she will not able to have MRI completed at this time. Patient has been checking BP's sporadically, BP log not accessible at time of call. Patient states emailing the log would work best. Email sent to the following confirmed email address: [email or web address] requesting log, as patient was in the car and unable to take down email information.

## 2025-02-25 ENCOUNTER — TELEPHONE (OUTPATIENT)
Dept: CARDIOLOGY CLINIC | Facility: CLINIC | Age: 45
End: 2025-02-25

## 2025-02-25 NOTE — TELEPHONE ENCOUNTER
L/m/m asking patient to cb to r/s 6/17/25 appt w Dr Jones. Also reminding patient to sched cardiac MRI and let us know-IV sedation can be done

## 2025-03-10 ENCOUNTER — TELEPHONE (OUTPATIENT)
Dept: CARDIOLOGY CLINIC | Facility: CLINIC | Age: 45
End: 2025-03-10

## 2025-04-16 ENCOUNTER — TELEPHONE (OUTPATIENT)
Dept: CARDIOLOGY CLINIC | Facility: CLINIC | Age: 45
End: 2025-04-16

## 2025-04-16 NOTE — TELEPHONE ENCOUNTER
Patient contacted HCM office by email to provide recent BP log and to express concerns with recent weight gain.  Patient states she's has gained over 80 lbs since last July and attributes same to beta blocker.      She is currently on Bisoprolol (Zebeta) 10 mg daily and would like an alternative.    BP log and concerns discussed with Dr. Jones. Bisoprolol continues to be the recommended medication for this patient's long standing HTN. Due to patient's Obstructive Hypertrophic Cardiomyopathy treatment for HTN is limited.     In addition to Bisoprolol, patient is to continue on Verapamil (Calan-SR) 240 mg daily & Hyzaar 100-25 mg daily.     Call placed to patient to discuss recommendations and to confirm that she is currently taking above mentioned meds. Call was dropped on 1st attempt. No answer- vm left second attempt. Patient is to return call to HCM office at [phone].    Bp's are as follows (dates not provided):    193/131 HR 67    196/125 HR 66    175/124 HR 68    184/126 HR 77    176/119 HR 73    174/114 HR 74    152/103 HR 78    147/101 HR 77    144/97 HR 76    143/96 HR 75    145/94 HR 76

## 2025-04-18 ENCOUNTER — TELEPHONE (OUTPATIENT)
Dept: CARDIOLOGY CLINIC | Facility: CLINIC | Age: 45
End: 2025-04-18

## 2025-04-18 DIAGNOSIS — I42.2 HYPERTROPHIC CARDIOMYOPATHY (HCC): Primary | ICD-10-CM

## 2025-04-18 DIAGNOSIS — I42.9 CARDIOMYOPATHY, UNSPECIFIED TYPE (HCC): Primary | ICD-10-CM

## 2025-04-18 RX ORDER — CARVEDILOL 25 MG/1
25 TABLET ORAL 2 TIMES DAILY WITH MEALS
Qty: 60 TABLET | Refills: 2 | Status: SHIPPED | OUTPATIENT
Start: 2025-04-18 | End: 2025-07-17

## 2025-04-18 NOTE — TELEPHONE ENCOUNTER
Patient returned call to [facility]CM office stating she is currently on Bisoprolol and Verapamil. Patient requesting that Bisoprolol be discontinued.     As per Dr. Jones, the following orders and changes have been made.     1) Bisoprolol dc'd   2) continue Verapamil 240 mg daily at bedtime  3) start half the 25 mg tablet of Carvedilol (12.5 mg) twice daily with meals- sent to [facility] [address], [place]  4) Monitor sodium intake, limit to 2 gm of sodium daily  5) Monitor/ record BP and HR twice a day- send in log in 1 wk  6) Complete ordered labs 1 wk after of starting Carvedilol    If Carvedilol is tolerated and BP's continue to be elevated, dose may need to be increased. Echo will be needed to monitor LVOT gradients.     Return call placed to patient to go over MD orders, no answer, vm left.

## 2025-05-07 ENCOUNTER — TELEPHONE (OUTPATIENT)
Dept: CARDIOLOGY CLINIC | Facility: CLINIC | Age: 45
End: 2025-05-07

## 2025-05-07 NOTE — TELEPHONE ENCOUNTER
Call placed to patient to obtain blood pressures and weight log since medication change. No answer, vm left.

## 2025-05-11 DIAGNOSIS — I42.9 CARDIOMYOPATHY, UNSPECIFIED TYPE (HCC): ICD-10-CM

## 2025-05-11 RX ORDER — CARVEDILOL 25 MG/1
25 TABLET ORAL 2 TIMES DAILY WITH MEALS
Qty: 180 TABLET | Refills: 0 | Status: SHIPPED | OUTPATIENT
Start: 2025-05-11

## 2025-05-19 ENCOUNTER — TELEPHONE (OUTPATIENT)
Dept: CARDIOLOGY CLINIC | Facility: CLINIC | Age: 45
End: 2025-05-19